# Patient Record
Sex: MALE | Race: BLACK OR AFRICAN AMERICAN | Employment: FULL TIME | URBAN - METROPOLITAN AREA
[De-identification: names, ages, dates, MRNs, and addresses within clinical notes are randomized per-mention and may not be internally consistent; named-entity substitution may affect disease eponyms.]

---

## 2019-02-18 ENCOUNTER — TELEPHONE (OUTPATIENT)
Dept: FAMILY MEDICINE | Facility: CLINIC | Age: 44
End: 2019-02-18

## 2019-02-18 NOTE — TELEPHONE ENCOUNTER
"Patient was \"no show\" for RN visit.  Perhaps he did not come in due to the message I left earlier.  Closing encounter.  Rosi Otto RN  [facility]      "

## 2019-02-18 NOTE — TELEPHONE ENCOUNTER
"Patient is on RN schedule for \"BP check\".   No previous encounters in Epic.   Appears was \"no show\" on 2/15/19 for an establish care appt at [facility] and for \"high BP\".    Patient should see a provider; RN unable to adjust meds or send any refills without patient having been established with a provider.    Will plan to simply check BP, triage symptoms, sent to ER or schedule as available if needed.         Attempted to call patient at home number, left message on voicemail; patient was instructed to return call to [facility] RN directly on the RN call back line at [phone]       Rosi Otto RN  [facility]    "

## 2019-03-15 ENCOUNTER — OFFICE VISIT (OUTPATIENT)
Dept: FAMILY MEDICINE | Facility: CLINIC | Age: 44
End: 2019-03-15
Payer: COMMERCIAL

## 2019-03-15 VITALS
SYSTOLIC BLOOD PRESSURE: 160 MMHG | HEART RATE: 109 BPM | TEMPERATURE: 99 F | DIASTOLIC BLOOD PRESSURE: 100 MMHG | WEIGHT: 188 LBS | OXYGEN SATURATION: 99 % | BODY MASS INDEX: 24.92 KG/M2 | HEIGHT: 73 IN

## 2019-03-15 DIAGNOSIS — Z72.0 TOBACCO ABUSE: ICD-10-CM

## 2019-03-15 DIAGNOSIS — F41.1 GAD (GENERALIZED ANXIETY DISORDER): ICD-10-CM

## 2019-03-15 DIAGNOSIS — I10 ESSENTIAL HYPERTENSION: Primary | ICD-10-CM

## 2019-03-15 LAB
ANION GAP SERPL CALCULATED.3IONS-SCNC: 14 MMOL/L (ref 3–14)
BUN SERPL-MCNC: 10 MG/DL (ref 7–30)
CALCIUM SERPL-MCNC: 9.4 MG/DL (ref 8.5–10.1)
CHLORIDE SERPL-SCNC: 100 MMOL/L (ref 94–109)
CO2 SERPL-SCNC: 20 MMOL/L (ref 20–32)
CREAT SERPL-MCNC: 1.01 MG/DL (ref 0.66–1.25)
GFR SERPL CREATININE-BSD FRML MDRD: >90 ML/MIN/{1.73_M2}
GLUCOSE SERPL-MCNC: 102 MG/DL (ref 70–99)
POTASSIUM SERPL-SCNC: 3.8 MMOL/L (ref 3.4–5.3)
SODIUM SERPL-SCNC: 134 MMOL/L (ref 133–144)

## 2019-03-15 PROCEDURE — 99204 OFFICE O/P NEW MOD 45 MIN: CPT | Performed by: FAMILY MEDICINE

## 2019-03-15 PROCEDURE — 80048 BASIC METABOLIC PNL TOTAL CA: CPT | Performed by: FAMILY MEDICINE

## 2019-03-15 PROCEDURE — 36415 COLL VENOUS BLD VENIPUNCTURE: CPT | Performed by: FAMILY MEDICINE

## 2019-03-15 RX ORDER — LISINOPRIL AND HYDROCHLOROTHIAZIDE 20; 25 MG/1; MG/1
1 TABLET ORAL DAILY
Qty: 30 TABLET | Refills: 0 | Status: SHIPPED | OUTPATIENT
Start: 2019-03-15 | End: 2019-07-18

## 2019-03-15 SDOH — HEALTH STABILITY: MENTAL HEALTH: HOW OFTEN DO YOU HAVE A DRINK CONTAINING ALCOHOL?: MONTHLY OR LESS

## 2019-03-15 ASSESSMENT — MIFFLIN-ST. JEOR: SCORE: 1805.25

## 2019-03-15 NOTE — PATIENT INSTRUCTIONS
Patient Education     Kicking the Smoking Habit  If you smoke, quitting is one of the best changes you can make for your heart and your overall health. Your risk of heart attack goes down within one day of putting out that last cigarette. As you go longer without smoking, your risk goes down even more. Quitting isn t easy, but millions of people have done it. You can, too. It s never too late to quit.  Getting started  Boost your chances of success by deciding on your  quit plan.  Your health care provider and cardiac rehab team can help you develop this plan. Even if you ve already quit, it s easy to slip back into smoking.  Your plan can help you avoid and recover from relapse.  In any case, start by setting a date to quit within a month, and do it.    Keys to your quit plan    Talk to your healthcare provider about prescription medicines and nicotine replacement products that help stop the urge to smoke.     Join a support group or quit smoking program. Talking with others about the challenges of quitting can help you get through them.    Ask other smokers in your household to quit with you.    Look for the cues in your life that you associate with smoking and avoid them.  Track your triggers  What gives you that  Z-lhqn-w-cigarette  feeling? List all the situations that make you want a cigarette. Then think of other ways to deal with these situations. Here are some examples:  Situation How I'll handle it   Finishing a meal Get up from the table and take a walk   Having an argument Find a quiet place and breathe deeply   Feeling lonely or bored Call a friend to talk         Tips for quitting successfully    List the benefits of quitting such as reducing heart risks and saving money. Keep this list and review it whenever you feel like smoking.    Get support. Let your friends know you may call them to chat when you have an urge to smoke.    If you ve tried to quit before without success, this time avoid the  triggers that may cause the relapse.    Make the most of slip-ups. Try to learn from them, and then get back on track.    Be accountable to your friends and your calendar so that you stay on track.  For family and friends    Be supportive and patient. Quitting smoking can be difficult and stressful.    If you smoke, now s a great time to quit. Even if you don t quit, never smoke around your loved one. Secondhand smoke is dangerous to his or her heart.    The best goals are accomplished in teams. Remember that when your loved one states he or she wants to stop smoking.  Date Last Reviewed: 7/1/2016 2000-2018 [employer]. [address] [place] [address], [place] [address]. All rights reserved. This information is not intended as a substitute for professional medical care. Always follow your healthcare professional's instructions.           Patient Education     Low-Salt Diet  This diet removes foods that are high in salt. It also limits the amount of salt you use when cooking. It is most often used for people with high blood pressure, edema (fluid retention), and kidney, liver, or heart disease.  Table salt contains the mineral sodium. Your body needs sodium to work normally. But too much sodium can make your health problems worse. Your healthcare provider is recommending a low-salt (also called low-sodium) diet for you. Your total daily allowance of salt is 1,500 to 2,300 milligrams (mg). It is less than 1 teaspoon of table salt. This means you can have only about 500 to 700 mg of sodium at each meal. People with certain health problems should limit salt intake to the lower end of the recommended range.    When you cook, don t add much salt. If you can cook without using salt, even better. Don t add salt to your food at the table.  When shopping, read food labels. Salt is often called sodium on the label. Choose foods that are salt-free, low salt, or very low salt. Note that foods with reduced salt may  not lower your salt intake enough.    Beans, potatoes, and pasta  Ok: Dry beans, split peas, lentils, potatoes, rice, macaroni, pasta, spaghetti without added salt  Avoid: Potato chips, tortilla chips, and similar products  Breads and cereals  Ok: Low-sodium breads, rolls, cereals, and cakes; low-salt crackers, matzo crackers  Avoid: Salted crackers, pretzels, popcorn, Mauritian toast, pancakes, muffins  Dairy  Ok: Milk, chocolate milk, hot chocolate mix, low-salt cheeses, and yogurt  Avoid: Processed cheese and cheese spreads; Roquefort, Camembert, and cottage cheese; buttermilk, instant breakfast drink  Desserts  Ok: Ice cream, frozen yogurt, juice bars, gelatin, cookies and pies, sugar, honey, jelly, hard candy  Avoid: Most pies, cakes and cookies prepared or processed with salt; instant pudding  Drinks  Ok: Tea, coffee, fizzy (carbonated) drinks, juices  Avoid: Flavored coffees, electrolyte replacement drinks, sports drinks  Meats  Ok: All fresh meat, fish, poultry, low-salt tuna, eggs, egg substitute  Avoid: Smoked, pickled, brine-cured, or salted meats and fish. This includes flores, chipped beef, corned beef, hot dogs, deli meats, ham, kosher meats, salt pork, sausage, canned tuna, salted codfish, smoked salmon, herring, sardines, or anchovies.  Seasonings and spices  Ok: Most seasonings are okay. Good substitutes for salt include: fresh herb blends, hot sauce, lemon, garlic, molina, vinegar, dry mustard, parsley, cilantro, horseradish, tomato paste, regular margarine, mayonnaise, unsalted butter, cream cheese, vegetable oil, cream, low-salt salad dressing and gravy.  Avoid: Regular ketchup, relishes, pickles, soy sauce, teriyaki sauce, Worcestershire sauce, BBQ sauce, tartar sauce, meat tenderizer, chili sauce, regular gravy, regular salad dressing, salted butter  Soups  Ok: Low-salt soups and broths made with allowed foods  Avoid: Bouillon cubes, soups with smoked or salted meats, regular soup and  broth  Vegetables  Ok: Most vegetables are okay; also low-salt tomato and vegetable juices  Avoid: Sauerkraut and other brine-soaked vegetables; pickles and other pickled vegetables; tomato juice, olives  Date Last Reviewed: 8/1/2016 2000-2018 [employer]. [address] [place] [address]. All rights reserved. This information is not intended as a substitute for professional medical care. Always follow your healthcare professional's instructions.         Monitor blood pressure  Follow up in 7- 10 days

## 2019-03-15 NOTE — PROGRESS NOTES
"  SUBJECTIVE:                                                    Gala Rogers is a 43 year old male who presents to clinic today for the following health issues:  Patient with history of tobacco abuse.  Comes in today with concern of blood pressure has been elevated.  He reports he initially became aware of his blood pressure was high a few months ago at healthy screening.  He denies any headache denies chest pain he does report history of generalized  Anxiety disorder, sometimes gets nervous.  Denies any family history of hypertension or heart disease or diabetes.    He reports he smoked he reports that 1 pack lasting for a few days.  Denies alcohol intake.  He does report to eat salty food.    He denies being lightheaded, denies dizzy denies headache denies chest pain no palpitation.  Denies lower extremity edema.  Patient reports he does get anxiety, he reports sometimes he may need to take times off because of his  \"nerve \".    Hypertension Follow-up      Outpatient blood pressures are not being checked.    Low Salt Diet: not monitoring salt      Amount of exercise or physical activity: 6-7 days/week for an average of 45-60 minutes    Problems taking medications regularly: No    Medication side effects: not applicable    Diet: regular (no restrictions)      Problem list and histories reviewed & adjusted, as indicated.  Additional history: as documented    Patient Active Problem List   Diagnosis     HTN (hypertension)     Tobacco abuse     ANNMARIE (generalized anxiety disorder)     History reviewed. No pertinent surgical history.    Social History     Tobacco Use     Smoking status: Current Every Day Smoker     Smokeless tobacco: Never Used   Substance Use Topics     Alcohol use: Yes     Frequency: Monthly or less     History reviewed. No pertinent family history.      Current Outpatient Medications   Medication Sig Dispense Refill     lisinopril-hydrochlorothiazide (PRINZIDE/ZESTORETIC) 20-25 MG tablet Take 1 " "tablet by mouth daily 30 tablet 0     Not on File  No lab results found.     ROS:  Constitutional, HEENT, cardiovascular, pulmonary, gi and gu systems are negative, except as otherwise noted.    OBJECTIVE:     BP (!) 160/100 (BP Location: Left arm)   Pulse 109   Temp 99  F (37.2  C) (Oral)   Ht 1.86 m (6' 1.23\")   Wt 85.3 kg (188 lb)   SpO2 99%   BMI 24.65 kg/m    Body mass index is 24.65 kg/m .  GENERAL: healthy, alert and no distress  NECK: no adenopathy, no asymmetry, masses, or scars and thyroid normal to palpation  RESP: lungs clear to auscultation - no rales, rhonchi or wheezes  CV: regular rate and rhythm, normal S1 S2, no S3 or S4, no murmur, click or rub, no peripheral edema and peripheral pulses strong  ABDOMEN: soft, nontender, no hepatosplenomegaly, no masses and bowel sounds normal  MS: no gross musculoskeletal defects noted, no edema    Diagnostic Test Results:  BMP: Pending    ASSESSMENT/PLAN:           ICD-10-CM    1. Essential hypertension I10 lisinopril-hydrochlorothiazide (PRINZIDE/ZESTORETIC) 20-25 MG tablet     Basic metabolic panel  (Ca, Cl, CO2, Creat, Gluc, K, Na, BUN)   2. Tobacco abuse Z72.0    3. ANNMARIE (generalized anxiety disorder) F41.1    Patient with history of hypertension.  He was advised with diet modification, reduce salt intake.  Cut down his coffee intake.  He was advised strongly to quit smoking.    Was started on lisinopril, hydrochlorothiazide.  I will obtain a basic metabolic panel today.    2.  History of tobacco abuse he was advised to quit smoking.  At this point he is not ready.    Patient also reports underlying history of Anxiety disorder.  I did discuss with the patient when he follow-up in a week time we will possibly discuss adding  medication to help with his symptoms.    Follow-up in 7-10 days or sooner    Patient Instructions       Patient Education     Kicking the Smoking Habit  If you smoke, quitting is one of the best changes you can make for your heart and " your overall health. Your risk of heart attack goes down within one day of putting out that last cigarette. As you go longer without smoking, your risk goes down even more. Quitting isn t easy, but millions of people have done it. You can, too. It s never too late to quit.  Getting started  Boost your chances of success by deciding on your  quit plan.  Your health care provider and cardiac rehab team can help you develop this plan. Even if you ve already quit, it s easy to slip back into smoking.  Your plan can help you avoid and recover from relapse.  In any case, start by setting a date to quit within a month, and do it.    Keys to your quit plan    Talk to your healthcare provider about prescription medicines and nicotine replacement products that help stop the urge to smoke.     Join a support group or quit smoking program. Talking with others about the challenges of quitting can help you get through them.    Ask other smokers in your household to quit with you.    Look for the cues in your life that you associate with smoking and avoid them.  Track your triggers  What gives you that  V-jbke-t-cigarette  feeling? List all the situations that make you want a cigarette. Then think of other ways to deal with these situations. Here are some examples:  Situation How I'll handle it   Finishing a meal Get up from the table and take a walk   Having an argument Find a quiet place and breathe deeply   Feeling lonely or bored Call a friend to talk         Tips for quitting successfully    List the benefits of quitting such as reducing heart risks and saving money. Keep this list and review it whenever you feel like smoking.    Get support. Let your friends know you may call them to chat when you have an urge to smoke.    If you ve tried to quit before without success, this time avoid the triggers that may cause the relapse.    Make the most of slip-ups. Try to learn from them, and then get back on track.    Be accountable  to your friends and your calendar so that you stay on track.  For family and friends    Be supportive and patient. Quitting smoking can be difficult and stressful.    If you smoke, now s a great time to quit. Even if you don t quit, never smoke around your loved one. Secondhand smoke is dangerous to his or her heart.    The best goals are accomplished in teams. Remember that when your loved one states he or she wants to stop smoking.  Date Last Reviewed: 7/1/2016 2000-2018 [employer]. [address] [place] [address], [place] [address]. All rights reserved. This information is not intended as a substitute for professional medical care. Always follow your healthcare professional's instructions.           Patient Education     Low-Salt Diet  This diet removes foods that are high in salt. It also limits the amount of salt you use when cooking. It is most often used for people with high blood pressure, edema (fluid retention), and kidney, liver, or heart disease.  Table salt contains the mineral sodium. Your body needs sodium to work normally. But too much sodium can make your health problems worse. Your healthcare provider is recommending a low-salt (also called low-sodium) diet for you. Your total daily allowance of salt is 1,500 to 2,300 milligrams (mg). It is less than 1 teaspoon of table salt. This means you can have only about 500 to 700 mg of sodium at each meal. People with certain health problems should limit salt intake to the lower end of the recommended range.    When you cook, don t add much salt. If you can cook without using salt, even better. Don t add salt to your food at the table.  When shopping, read food labels. Salt is often called sodium on the label. Choose foods that are salt-free, low salt, or very low salt. Note that foods with reduced salt may not lower your salt intake enough.    Beans, potatoes, and pasta  Ok: Dry beans, split peas, lentils, potatoes, rice, macaroni, pasta,  spaghetti without added salt  Avoid: Potato chips, tortilla chips, and similar products  Breads and cereals  Ok: Low-sodium breads, rolls, cereals, and cakes; low-salt crackers, matzo crackers  Avoid: Salted crackers, pretzels, popcorn, Greek toast, pancakes, muffins  Dairy  Ok: Milk, chocolate milk, hot chocolate mix, low-salt cheeses, and yogurt  Avoid: Processed cheese and cheese spreads; Roquefort, Camembert, and cottage cheese; buttermilk, instant breakfast drink  Desserts  Ok: Ice cream, frozen yogurt, juice bars, gelatin, cookies and pies, sugar, honey, jelly, hard candy  Avoid: Most pies, cakes and cookies prepared or processed with salt; instant pudding  Drinks  Ok: Tea, coffee, fizzy (carbonated) drinks, juices  Avoid: Flavored coffees, electrolyte replacement drinks, sports drinks  Meats  Ok: All fresh meat, fish, poultry, low-salt tuna, eggs, egg substitute  Avoid: Smoked, pickled, brine-cured, or salted meats and fish. This includes flores, chipped beef, corned beef, hot dogs, deli meats, ham, kosher meats, salt pork, sausage, canned tuna, salted codfish, smoked salmon, herring, sardines, or anchovies.  Seasonings and spices  Ok: Most seasonings are okay. Good substitutes for salt include: fresh herb blends, hot sauce, lemon, garlic, molina, vinegar, dry mustard, parsley, cilantro, horseradish, tomato paste, regular margarine, mayonnaise, unsalted butter, cream cheese, vegetable oil, cream, low-salt salad dressing and gravy.  Avoid: Regular ketchup, relishes, pickles, soy sauce, teriyaki sauce, Worcestershire sauce, BBQ sauce, tartar sauce, meat tenderizer, chili sauce, regular gravy, regular salad dressing, salted butter  Soups  Ok: Low-salt soups and broths made with allowed foods  Avoid: Bouillon cubes, soups with smoked or salted meats, regular soup and broth  Vegetables  Ok: Most vegetables are okay; also low-salt tomato and vegetable juices  Avoid: Sauerkraut and other brine-soaked vegetables;  pickles and other pickled vegetables; tomato juice, olives  Date Last Reviewed: 8/1/2016 2000-2018 [employer]. [address] [place] [address], [place] [address]. All rights reserved. This information is not intended as a substitute for professional medical care. Always follow your healthcare professional's instructions.         Monitor blood pressure  Follow up in 7- 10 days      Bob Quezada MD  [facility]

## 2019-03-18 ENCOUNTER — TELEPHONE (OUTPATIENT)
Dept: FAMILY MEDICINE | Facility: CLINIC | Age: 44
End: 2019-03-18

## 2019-03-18 NOTE — TELEPHONE ENCOUNTER
Patient was seen 3/15/19 by Dr. Quezada.    See visit notes:    Patient with history of hypertension.  He was advised with diet modification, reduce salt intake.  Cut down his coffee intake.  He was advised strongly to quit smoking.     Was started on lisinopril, hydrochlorothiazide.  I will obtain a basic metabolic panel today.     2.  History of tobacco abuse he was advised to quit smoking.  At this point he is not ready.     Patient also reports underlying history of Anxiety disorder.  I did discuss with the patient when he follow-up in a week time we will possibly discuss adding  medication to help with his symptoms.     Follow-up in 7-10 days or sooner      I called and spoke to patient, he say his BP has gotten a little lower, denies any adverse effects of the new med.     Scheduled for follow up as advised.    Patient verbalized understanding of and agreement with plan.    Rosi Otto RN  [facility]

## 2019-03-18 NOTE — TELEPHONE ENCOUNTER
Please inform pt. Of normal kidney funtion  Please have pt. Follow up in one wk time to re check blood pressure and lab  thanks

## 2019-07-18 ENCOUNTER — OFFICE VISIT (OUTPATIENT)
Dept: FAMILY MEDICINE | Facility: CLINIC | Age: 44
End: 2019-07-18
Payer: COMMERCIAL

## 2019-07-18 VITALS
BODY MASS INDEX: 26.77 KG/M2 | DIASTOLIC BLOOD PRESSURE: 82 MMHG | WEIGHT: 202 LBS | SYSTOLIC BLOOD PRESSURE: 149 MMHG | HEART RATE: 92 BPM | TEMPERATURE: 98.6 F | HEIGHT: 73 IN
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
## 2019-07-18 DIAGNOSIS — S80.212A ABRASION OF LEFT KNEE, INITIAL ENCOUNTER: ICD-10-CM

## 2019-07-18 DIAGNOSIS — I10 ESSENTIAL HYPERTENSION: ICD-10-CM

## 2019-07-18 DIAGNOSIS — S01.81XA FACIAL LACERATION, INITIAL ENCOUNTER: ICD-10-CM

## 2019-07-18 DIAGNOSIS — S93.402A SPRAIN OF LEFT ANKLE, UNSPECIFIED LIGAMENT, INITIAL ENCOUNTER: ICD-10-CM

## 2019-07-18 PROCEDURE — 99213 OFFICE O/P EST LOW 20 MIN: CPT | Performed by: PHYSICIAN ASSISTANT

## 2019-07-18 RX ORDER — LISINOPRIL AND HYDROCHLOROTHIAZIDE 20; 25 MG/1; MG/1
1 TABLET ORAL DAILY
Qty: 30 TABLET | Refills: 1 | Status: SHIPPED | OUTPATIENT
Start: 2019-07-18 | End: 2019-10-30

## 2019-07-18 ASSESSMENT — MIFFLIN-ST. JEOR: SCORE: 1868.76
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
## 2019-07-18 NOTE — PATIENT INSTRUCTIONS
Ibuprofen 800mg every 8 hours, take with food.   Ice 10 minutes 3-4 times per day on the foot.

## 2019-07-22 ENCOUNTER — TELEPHONE (OUTPATIENT)
Dept: FAMILY MEDICINE | Facility: CLINIC | Age: 44
End: 2019-07-22
Payer: COMMERCIAL

## 2019-07-22 NOTE — TELEPHONE ENCOUNTER
Reason for Call:  Other call back    Detailed comments: Patient's wife called in stating they saw Millie last week and they were advised to call back in if patient was still not well enough to go back to work as of today. Patient's wife states that the patient stayed home again today and they are requesting to get a Doctors note for the time he is missing. She states they would like to  the form from the clinic. Please call to discuss.     Phone Number Patient can be reached at: Home number on file [phone] (home)    Best Time: any     Can we leave a detailed message on this number? YES    Call taken on 7/22/2019 at 1:26 PM by Freya Lopez

## 2019-07-22 NOTE — TELEPHONE ENCOUNTER
I have done a note for today. If he needs to remain out longer please clarify the length of time. Note given to NEYMAR Stinson PA-C
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
## 2019-09-05 ENCOUNTER — TELEPHONE (OUTPATIENT)
Dept: FAMILY MEDICINE | Facility: CLINIC | Age: 44
End: 2019-09-05

## 2019-09-05 NOTE — TELEPHONE ENCOUNTER
Panel Management Review      Patient has the following on his problem list:     Hypertension   Last three blood pressure readings:  BP Readings from Last 3 Encounters:   07/18/19 149/82   03/15/19 (!) 160/100     Blood pressure: FAILED    HTN Guidelines:  Less than 140/90      Composite cancer screening  Chart review shows that this patient is due/due soon for the following None  Summary:    Patient is due/failing the following:   Lipid, BP CHECK and PHYSICAL    Action needed:   Patient needs office visit for Hypertension, Physical, and Lipid.    Type of outreach:    Sent letter.    Questions for provider review:    None                                                                                                                                    EMERY French MA       Chart routed to Care Team .

## 2019-09-05 NOTE — LETTER
October 2, 2019    Gala Rogers  [address]  [place] [address]      Dear Gala Rogers,     We have tried to contact you about your health, but have been unable to reach you.  Please call us as soon as possible so we can provide you with the best care possible.  We will continue to check in with you throughout the year to complete these items of care, if you are not able to complete these items at this time.  If you would like to complete the missing items for your care, please contact us at [phone].    We recommend the following:  -schedule a LAB ONLY APPOINTMENT to recheck your: Lipids (fasting cholesterol - nothing to eat except water and/or meds for 8+ hours) test within the next 1-4 weeks.  -schedule a PHYSICAL with your provider.        Sincerely,     Your Care Team at [facility]        BUNNY/

## 2019-09-05 NOTE — LETTER
September 5, 2019    Gala Rogers  [address]  [place] [address]    Dear Gala    We care about your health and have reviewed your health plan. We have reviewed your medical conditions, medication list, and lab results and are making recommendations based on this review, to better manage your health.    You are in particular need of attention regarding:  - Your High Blood Pressure, Please call to schedule an appointment with your provider or nurse  - Scheduling Your Physical      Here is a list of Health Maintenance topics that are due now or due soon:  Health Maintenance Due   Topic Date Due     PREVENTIVE CARE VISIT  1975     HIV SCREENING  09/19/1990     LIPID  09/19/2010     PHQ-2  01/01/2019     INFLUENZA VACCINE (1) 09/01/2019       Please call us at [phone] (or use [employer]) to address the above recommendations. If we do not hear from you in the next couple of weeks we will be reaching out to you again.    Thank you for trusting [facility] and we appreciate the opportunity to serve you.  We look forward to supporting your healthcare needs in the future.    Healthy Regards,    [email or web address].ML

## 2019-10-02 NOTE — TELEPHONE ENCOUNTER
Type of outreach:    Sent final hm letter.                  EMERY French MA       Chart routed to Care Team .

## 2019-10-30 DIAGNOSIS — I10 ESSENTIAL HYPERTENSION: ICD-10-CM

## 2019-10-30 NOTE — TELEPHONE ENCOUNTER
"Requested Prescriptions   Pending Prescriptions Disp Refills     lisinopril-hydrochlorothiazide (PRINZIDE/ZESTORETIC) 20-25 MG tablet [Pharmacy Med Name: LISINOPRIL-HYDROCHLOROTH 20-25 TABS] 30 tablet 1     Sig: TAKE ONE TABLET BY MOUTH ONCE DAILY   Last Written Prescription Date:  7/18/19  Last Fill Quantity: 30,  # refills: 1   Last office visit: 7/18/2019 with prescribing provider:     Future Office Visit:        Diuretics (Including Combos) Protocol Failed - 10/30/2019  1:22 PM        Failed - Blood pressure under 140/90 in past 12 months     BP Readings from Last 3 Encounters:   07/18/19 149/82   03/15/19 (!) 160/100                 Passed - Recent (12 mo) or future (30 days) visit within the authorizing provider's specialty     Patient has had an office visit with the authorizing provider or a provider within the authorizing providers department within the previous 12 mos or has a future within next 30 days. See \"Patient Info\" tab in inbasket, or \"Choose Columns\" in Meds & Orders section of the refill encounter.              Passed - Medication is active on med list        Passed - Patient is age 18 or older        Passed - Normal serum creatinine on file in past 12 months     Recent Labs   Lab Test 03/15/19  1030   CR 1.01              Passed - Normal serum potassium on file in past 12 months     Recent Labs   Lab Test 03/15/19  1030   POTASSIUM 3.8                    Passed - Normal serum sodium on file in past 12 months     Recent Labs   Lab Test 03/15/19  1030                   "

## 2019-10-31 RX ORDER — LISINOPRIL AND HYDROCHLOROTHIAZIDE 20; 25 MG/1; MG/1
TABLET ORAL
Qty: 15 TABLET | Refills: 1 | Status: SHIPPED | OUTPATIENT
Start: 2019-10-31

## 2019-10-31 NOTE — TELEPHONE ENCOUNTER
Patient needs a follow up. Should have been out of medication by now if taking correctly. #15  Millie Stinson PA-C

## 2019-10-31 NOTE — TELEPHONE ENCOUNTER
Routing refill request to provider for review/approval because:  Failed protocols        Emily Paiz RN  [facility]

## 2020-02-04 ENCOUNTER — TELEPHONE (OUTPATIENT)
Dept: FAMILY MEDICINE | Facility: CLINIC | Age: 45
End: 2020-02-04

## 2020-02-04 NOTE — LETTER
February 20, 2020    [place] [id number]      Dear Gala,    We have tried to contact you about your health, but have been unable to reach you.  We will continue to check in with you throughout the year to complete these items of care, if you are not able to complete these items at this time. Please contact us at [phone] if you would like to speak with a member of your care team or schedule an appointment.    We recommend the following:  - High Blood Pressure - please call to schedule a follow up with your provider or a BP check only appointment on the ancillary schedule. Or you may stop by our pharmacy for a blood pressure check.  - Scheduling an Annual Physical / Wellness Visit with your primary care provider      Healthy regards,     Your Care Team     (Team 3)

## 2020-02-04 NOTE — TELEPHONE ENCOUNTER
Patient Quality Outreach      Summary:    Patient is due/failing the following:   BP check and Adult/Adolescent physical, date due: 1975    Type of outreach:    Sent letter.    Questions for provider review:    None                                                                                   Patient has the following on his problem list:     Hypertension   Last three blood pressure readings:  BP Readings from Last 3 Encounters:   07/18/19 149/82   03/15/19 (!) 160/100     Blood pressure: Failed    HTN Guidelines:  ? 139/89       Palak Youssef,        Chart routed to Care Team.

## 2020-02-04 NOTE — LETTER
February 4, 2020    [place] [id number]    Dear Gala,    We care about your health and have reviewed your health plan. You are in particular need of attention regarding:    - High Blood Pressure - please call to schedule a follow up with your provider or a BP check only appointment on the ancillary schedule. Or you may stop by our pharmacy for a blood pressure check.  - Scheduling an Annual Physical / Wellness Visit with your primary care provider    Here is a list of other Health Maintenance topics that are due now or due soon:  Health Maintenance Due   Topic Date Due     PREVENTIVE CARE VISIT  1975     HIV SCREENING  09/19/1990     PNEUMOCOCCAL IMMUNIZATION 19-64 MEDIUM RISK (1 of 1 - PPSV23) 09/19/1994     LIPID  09/19/2010     PHQ-2  01/01/2020     Please call us at [phone], or use [employer], to address the above recommendations.     Thank you for trusting [facility] with your healthcare needs.     Healthy Regards,    Your Care Team    (Team 3)

## 2020-02-20 NOTE — TELEPHONE ENCOUNTER
Panel Management Review  Summary:    Type of outreach:    Sent letter. and no phone number on file. final letter mailed    Encounter routed to No Action Needed.                                                                                                                               Palak Youssef,